# Patient Record
Sex: MALE | Race: WHITE | NOT HISPANIC OR LATINO | URBAN - METROPOLITAN AREA
[De-identification: names, ages, dates, MRNs, and addresses within clinical notes are randomized per-mention and may not be internally consistent; named-entity substitution may affect disease eponyms.]

---

## 2018-03-27 ENCOUNTER — TELEPHONE (OUTPATIENT)
Dept: OTHER | Facility: CLINIC | Age: 33
End: 2018-03-27

## 2018-03-27 NOTE — TELEPHONE ENCOUNTER
3/27/2018    Call Regarding Onboarding [employer]    Attempt 1    Message on voicemail     Comments:       Outreach   sb

## 2018-07-11 NOTE — TELEPHONE ENCOUNTER
7/11/2018      [employer] [facility]- declined on-boarding.             Outreach ,  Vicente De Luna

## 2019-01-14 ENCOUNTER — OFFICE VISIT - HEALTHEAST (OUTPATIENT)
Dept: FAMILY MEDICINE | Facility: CLINIC | Age: 34
End: 2019-01-14

## 2019-01-14 DIAGNOSIS — Z00.00 WELL ADULT EXAM: ICD-10-CM

## 2019-01-14 ASSESSMENT — MIFFLIN-ST. JEOR: SCORE: 1859.13

## 2021-06-02 VITALS — WEIGHT: 197 LBS | BODY MASS INDEX: 27.58 KG/M2 | HEIGHT: 71 IN

## 2021-06-23 NOTE — PROGRESS NOTES
HPI:  Kimo Da Silva is a 33 y.o. male who is seen for   Chief Complaint   Patient presents with     Annual Exam   Kimo Da Silva is seen for physical today.  He has had a history of some back pain but this is resolved.  He is on no regular medications and has no chronic illnesses.  He did have some recent gastroenteritis which has resolved.  His employer has a upcoming wellness blood draw that he will have done, and get cholesterol and blood sugars checked.  He has not had a history of diabetes or hyperlipidemia.  ROS: Patient denies fever, chills, sweats, fainting, fatigue, weight change, dizziness, sleep problems, chest pain, palpitations, shortness of breath, wheezing, cough,  sore throat, changes in hearing, ear pain,tinnitus,  disphagia, sore throat, globus, changes in vision, eye pain eye redness, acid reflux, nausea, vomiting, diarrhea, constipation, black or bloody stools,  Dysuria, frequency, urinary incontinence, nocturia, hematuria, back pain,joint pain, bone pain, muscle cramps,edema, weakness, numbness, tingling of extremities, rash, itching, skin changes, swollen lymph nodes, thirst, increased urination, breast lumps, breast pain, nipple discharge, memory difficulties, anxiety, mood swings, (female)vaginal discharge, dyspareunia, menorrhagia, pelvic pain, sexual dysfunction,   (male) testicular lumps, erectile dysfunction.    No results found for: HGBA1C  No results found for: GLUF, MICROALBUR, LDLCALC, CREATININE  There is no problem list on file for this patient.    Family History   Problem Relation Age of Onset     Sjogren's syndrome Mother      Hypertension Maternal Grandmother      Hyperlipidemia Maternal Grandmother      Breast cancer Maternal Grandmother      Melanoma Maternal Grandmother      Social History     Socioeconomic History     Marital status:      Spouse name: None     Number of children: None     Years of education: None     Highest education level: None   Social  Needs     Financial resource strain: None     Food insecurity - worry: None     Food insecurity - inability: None     Transportation needs - medical: None     Transportation needs - non-medical: None   Occupational History     Occupation: Researcher   Tobacco Use     Smoking status: Never Smoker     Smokeless tobacco: Never Used   Substance and Sexual Activity     Alcohol use: Yes     Frequency: 2-3 times a week     Drinks per session: 3 or 4     Drug use: No     Sexual activity: Yes     Partners: Female   Other Topics Concern     None   Social History Narrative     None     History reviewed. No pertinent surgical history.  No current outpatient medications on file prior to visit.     No current facility-administered medications on file prior to visit.      No Known Allergies    I have reviewed the patient's medical history in detail and updated the computerized patient record.  OBJECTIVE:  Wt Readings from Last 3 Encounters:   01/14/19 197 lb (89.4 kg)     Temp Readings from Last 3 Encounters:   01/14/19 98.2  F (36.8  C) (Oral)     BP Readings from Last 3 Encounters:   01/14/19 100/58     Pulse Readings from Last 3 Encounters:   01/14/19 64     Body mass index is 27.55 kg/m .     Alert, cooperative, well-hydrated. Appears well.  Eyes: Pupils equal, round, reactive to light.  HEENT: Sclera white, nares patent, MMM, TM's pearly bilaterally  Neck: supple, without lymphadenopathy, Thyroid freely movable and without hypotrophy or nodularity.   Lungs: Clear to auscultation. No retractions, no increased work of respiration, equal chest rise.   Heart: Regular rate and rhythm, no murmurs, clicks,   Gallops.  Abdomen: Soft, bowel sounds in 4 quadrants with no tenderness to palpation, no organomegaly or masses, no aortic or renal bruits.  Extremities: no tenderness to palpation of gastrocnemius, bilaterally.  Skin: no increased warmth, edema, or erythema of lower legs bilaterally.  Back: No cervical, thoracic or lumbar  tenderness to spinous processes or musculature.  Neuro::pupils equal and reactive to light bilaterally, CN II - XII grossly intact. No focal motor/sensory deficits. DTR 2/4 all 4 extremities. Muscle Strength 5/5 all extremities, Rhomberg negative      ASSESMENT/PLAN:  1. Well adult exam     Advised health diet, 6-8 fruit and vegetables daily and discussed juicing in am with protein powder for breakfast.  Advised regular exercise, discussed regular aerobic exercise and strength training.  Advised not more than 1 alcoholic drinks in any given day.  Caffeine: not more than 2 daily.  Water: 6-8 glasses daily.  Advised to get a copy of his labs to our office when they are available.  Follow-up in 1 year.  Kylah Lowe, MS, PA-C 01/16/19

## 2021-06-23 NOTE — PATIENT INSTRUCTIONS - HE
Advised health diet, 6-8 fruit and vegetables daily and discussed juicing in am with protein powder for breakfast.  Advised regular exercise, discussed regular aerobic exercise and strength training.  Advised not more than 1 alcoholic drinks in any given day.  Caffeine: not more than 2 daily.  Water: 6-8 glasses daily.

## 2021-08-14 ENCOUNTER — HEALTH MAINTENANCE LETTER (OUTPATIENT)
Age: 36
End: 2021-08-14

## 2021-10-10 ENCOUNTER — HEALTH MAINTENANCE LETTER (OUTPATIENT)
Age: 36
End: 2021-10-10

## 2022-09-17 ENCOUNTER — HEALTH MAINTENANCE LETTER (OUTPATIENT)
Age: 37
End: 2022-09-17

## 2023-10-08 ENCOUNTER — HEALTH MAINTENANCE LETTER (OUTPATIENT)
Age: 38
End: 2023-10-08